# Patient Record
Sex: MALE | Race: WHITE
[De-identification: names, ages, dates, MRNs, and addresses within clinical notes are randomized per-mention and may not be internally consistent; named-entity substitution may affect disease eponyms.]

---

## 2020-12-23 ENCOUNTER — HOSPITAL ENCOUNTER (EMERGENCY)
Dept: HOSPITAL 97 - ER | Age: 70
Discharge: HOME | End: 2020-12-23
Payer: COMMERCIAL

## 2020-12-23 VITALS — SYSTOLIC BLOOD PRESSURE: 137 MMHG | TEMPERATURE: 98 F | OXYGEN SATURATION: 100 % | DIASTOLIC BLOOD PRESSURE: 89 MMHG

## 2020-12-23 DIAGNOSIS — R05: ICD-10-CM

## 2020-12-23 DIAGNOSIS — U07.1: Primary | ICD-10-CM

## 2020-12-23 LAB
BUN BLD-MCNC: 15 MG/DL (ref 7–18)
GLUCOSE SERPLBLD-MCNC: 107 MG/DL (ref 74–106)
HCT VFR BLD CALC: 35.2 % (ref 39.6–49)
LYMPHOCYTES # SPEC AUTO: 0.5 K/UL (ref 0.7–4.9)
PMV BLD: 7.8 FL (ref 7.6–11.3)
POTASSIUM SERPL-SCNC: 3.4 MMOL/L (ref 3.5–5.1)
RBC # BLD: 3.85 M/UL (ref 4.33–5.43)

## 2020-12-23 PROCEDURE — 71045 X-RAY EXAM CHEST 1 VIEW: CPT

## 2020-12-23 PROCEDURE — 96361 HYDRATE IV INFUSION ADD-ON: CPT

## 2020-12-23 PROCEDURE — 85025 COMPLETE CBC W/AUTO DIFF WBC: CPT

## 2020-12-23 PROCEDURE — 96374 THER/PROPH/DIAG INJ IV PUSH: CPT

## 2020-12-23 PROCEDURE — 99284 EMERGENCY DEPT VISIT MOD MDM: CPT

## 2020-12-23 PROCEDURE — 80048 BASIC METABOLIC PNL TOTAL CA: CPT

## 2020-12-23 PROCEDURE — 36415 COLL VENOUS BLD VENIPUNCTURE: CPT

## 2020-12-23 NOTE — ER
Nurse's Notes                                                                                     

 [facility]                                                                 

Name: Baljeet Baker                                                                             

Age: 70 yrs                                                                                       

Sex: Male                                                                                         

: 1950                                                                                   

MRN: [id number]                                                                                   

Arrival Date: 2020                                                                          

Time: 19:09                                                                                       

Account#: [id number]                                                                            

Bed 19                                                                                            

Private MD:                                                                                       

Diagnosis: SARS-associated coronavirus as the cause of diseases classified elsewhere;Fever,       

  unspecified                                                                                     

                                                                                                  

Presentation:                                                                                     

                                                                                             

20:03 Chief complaint: Patient states: Covid positive 12/15. Fever and SOB now. Had N/V/D,    ll1 

      but now has resolved. States he thought he was getting better, but fever spiked again       

      today.                                                                                      

20:05 Coronavirus screen: Client denies travel out of the [place] in the last 14 days. cough     ll1 

      unrelated to allergies, fatigue, fever, Client presents with at least one sign or           

      symptom that may indicate coronavirus-19. Standard/surgical mask placed on the client.      

      Client reports previous positive COVID test result. Ebola Screen: Patient denies travel     

      to an Ebola-affected area in the 21 days before illness onset. Initial Sepsis Screen:       

      Does the patient meet any 2 criteria? No. Patient's initial sepsis screen is negative.      

      Does the patient have a suspected source of infection? Yes: Productive cough/pneumonia.     

      Risk Assessment: Do you want to hurt yourself or someone else? Patient reports no           

      desire to harm self or others. Onset of symptoms was 2020.                     

20:05 Method Of Arrival: Ambulatory                                                           ll1 

20:05 Acuity: DENISE 3                                                                           ll1 

                                                                                                  

Historical:                                                                                       

- Allergies:                                                                                      

20:03 No Known Allergies;                                                                     ll1 

- PMHx:                                                                                           

20:03 Asthma;                                                                                 ll1 

- PSHx:                                                                                           

20:03 sinus surgeries x 4;                                                                    ll1 

                                                                                                  

- Immunization history:: Flu vaccine is up to date.                                               

- Social history:: Smoking status: Patient denies any tobacco usage or history of.                

                                                                                                  

                                                                                                  

Screenin:20 Abuse screen: Denies threats or abuse. Nutritional screening: No deficits noted.        ll2 

      Tuberculosis screening: No symptoms or risk factors identified. Fall Risk None              

      identified.                                                                                 

                                                                                                  

Assessment:                                                                                       

19:20 General: Appears in no apparent distress. Behavior is calm, cooperative, appropriate    ll2 

      for age. Pain: Denies pain. Neuro: Level of Consciousness is awake, alert, obeys            

      commands, Oriented to person, place, time, situation. Cardiovascular: Patient's skin is     

      warm and dry. Respiratory: Airway is patent Respiratory effort is even, unlabored,          

      Respiratory pattern is regular, symmetrical, Breath sounds are clear. GI: No signs          

      and/or symptoms were reported involving the gastrointestinal system. : No signs           

      and/or symptoms were reported regarding the genitourinary system. EENT: No signs and/or     

      symptoms were reported regarding the EENT system. Derm: Skin is intact, Skin is dry,        

      Skin is pink, warm \T\ dry. Musculoskeletal: Circulation, motion, and sensation intact.     

      Range of motion:.                                                                           

20:30 Reassessment: Patient and/or family updated on plan of care and expected duration. Pain ll2 

      level reassessed. Patient is alert, oriented x 3, equal unlabored respirations, skin        

      warm/dry/pink. son at bedside, pt denies pain at this time.                                 

21:30 Reassessment: Patient and/or family updated on plan of care and expected duration. Pain ll2 

      level reassessed. Patient is alert, oriented x 3, equal unlabored respirations, skin        

      warm/dry/pink. pt resting comfortably in bed.                                               

22:30 Reassessment: Patient and/or family updated on plan of care and expected duration. Pain ll2 

      level reassessed. Patient is alert, oriented x 3, equal unlabored respirations, skin        

      warm/dry/pink.                                                                              

                                                                                                  

Vital Signs:                                                                                      

19:22  / 79; Pulse 75; Resp 18; Temp 98; Pulse Ox 99% on R/A;                           ll2 

20:05  / 74; Pulse 60; Resp 18; Temp 98.0; Pulse Ox 100% ; Weight 79.38 kg; Height 6    ll1 

      ft. 0 in. (182.88 cm); Pain 0/10;                                                           

20:15  / 87; Pulse 75; Resp 16; Pulse Ox 99% on R/A;                                    ll2 

21:15  / 75; Pulse 55; Resp 18; Pulse Ox 99% on R/A;                                    ll2 

22:15  / 86; Pulse 50; Resp 17; Pulse Ox 99% on R/A;                                    ll2 

23:15  / 89; Pulse 50; Resp 18; Temp 98; Pulse Ox 100% on R/A;                          ll2 

20:05 Body Mass Index 23.73 (79.38 kg, 182.88 cm)                                             ll1 

                                                                                                  

ED Course:                                                                                        

19:09 Patient arrived in ED.                                                                  rg4 

19:20 Patient has correct armband on for positive identification. Placed in gown. Bed in low  ll2 

      position. Call light in reach. Side rails up X 1. Adult w/ patient. Pulse ox on. NIBP       

      on.                                                                                         

19:20 No provider procedures requiring assistance completed. Inserted saline lock: 22 gauge   ll2 

      in right antecubital area, using aseptic technique. Blood collected.                        

20:02 Arm band placed on Patient placed in an exam room, on a stretcher.                      ll1 

20:06 Triage completed.                                                                       ll1 

20:44 Sabine Cope FNP-C is PHCP.                                                          snw 

20:44 Nasir Llamas MD is Attending Physician.                                           snw 

20:53 Christine Kim, JURGEN is Primary Nurse.                                                  ll2 

21:50 Chest Single View XRAY In Process Unspecified.                                          EDMS

23:16 IV discontinued, intact, bleeding controlled, No redness/swelling at site. Pressure     ll2 

      dressing applied.                                                                           

                                                                                                  

Administered Medications:                                                                         

22:00 Drug: Aspirin Chewable Tablet 81 mg Route: PO;                                          ll2 

23:17 Follow up: Response: No adverse reaction                                                ll2 

22:00 Drug: PlaVIX 75 mg Route: PO;                                                           ll2 

23:17 Follow up: Response: No adverse reaction                                                ll2 

22:15 Drug: NS 0.9% 1000 ml Route: IV; Rate: 1 bolus; Site: right antecubital;                ll2 

23:17 Follow up: Response: No adverse reaction; IV Status: Completed infusion; IV Intake:     ll2 

      1000ml                                                                                      

22:20 Drug: fentaNYL (PF) 25 mcg Route: IVP; Site: right antecubital;                         ll2 

23:17 Follow up: Response: No adverse reaction                                                ll2 

                                                                                                  

                                                                                                  

Intake:                                                                                           

23:17 IV: 1000ml; Total: 1000ml.                                                              ll2 

                                                                                                  

Outcome:                                                                                          

22:48 Discharge ordered by MD.                                                                snw 

23:16 Discharged to home ambulatory.                                                          ll2 

23:16 Condition: stable                                                                           

23:16 Discharge instructions given to patient, Instructed on discharge instructions, follow       

      up and referral plans. Demonstrated understanding of instructions, follow-up care.          

23:17 Patient left the ED.                                                                    ll2 

                                                                                                  

Signatures:                                                                                       

Dispatcher [employer]                           EDMS                                                 

Sabine Cope FNP-C                   FNP-Csnw                                                  

Amalia Marquez                                 rg4                                                  

Christine Kim, RN                    RN   ll2                                                  

Laura Herron RN                       RN   ll1                                                  

                                                                                                  

**************************************************************************************************

## 2020-12-23 NOTE — XMS REPORT
Clinical Summary

                          Created on:2020



Patient:Baljeet Baker

Sex:Male

:1950

External Reference #:[id number]





Demographics







                          Address                   [address] 



                                                    [place] [address]

 

                          Home Phone                [phone]

 

                          Mobile Phone              [phone]

 

                          Email Address             [email or web address]

 

                          Preferred Language        English

 

                          Marital Status            Unknown

 

                          Yarsani Affiliation     Unknown

 

                          Race                      White

 

                          Ethnic Group              Not  or 









Author







                          Organization              [facility]

 

                          Address                   [address] [place] [address]









Support







                Name            Relationship    Address         Phone

 

                Kourtney Baker   Unavailable     Unavailable     [phone]









Care Team Providers







                    Name                Role                Phone

 

                    Chance Godfrey       Primary Care Provider [phone]









Allergies

No Known Allergies



Medications







          Medication Sig       Dispensed Refills   Start Date End Date  Status

 

          budesonide-formoterol Inhale 2 puffs by           0                   

          Active



          (SYMBICORT) 160-4.5 mouth via inhaler                                 

        



          mcg/actuation inhaler 2 (two) times                                   

      



                    daily.                                            

 

          OMEPRAZOLE ORAL Take by mouth.           0                            

 Active

 

          montelukast Take 10 mg by           0                             Acti

ve



          (SINGULAIR) 10 mg mouth nightly.                                      

   



          tablet                                                      







Active Problems

Not on file



Social History







             Tobacco Use  Types        Packs/Day    Years Used   Date

 

             Never Smoker                                        









                Smokeless Tobacco: Never Used                                 









                                        Tobacco Cessation: Counseling Given: No









                Alcohol Use     Drinks/Week     oz/Week         Comments

 

                No                                              









                          Sex Assigned at Birth     Date Recorded

 

                          Not on file               







Last Filed Vital Signs

Not on file



Plan of Treatment







                Health Maintenance Due Date        Last Done       Comments

 

                COLON CANCER SCREENING COLONOSCOPY 1950                   

   

 

                PNEUMOCOCCAL 65+ YRS (1 of 1 - [id number]) 10/26/2014 

                     

 

                INFLUENZA VACCINE (#1) 2020                      







Results

Not on fileafter 2019



Insurance







          Payer     Benefit Plan / Subscriber ID Effective Dates Phone     Addre

ss   Type



                    Group                                             

 

          [employer] PPO/POS [id number] 2018-Present                

     PPO



          [facility]ALTH[facility]ARE SYSTEM                                                   









           Guarantor Name Account Type Relation to Date of    Phone      Billing



                                 Patient    Birth                 Address

 

           Baljeet Baker Personal/Family Self       1950 [phone]

[address]                                      (Home)     [address]







                                                                  [place]



                                                                  [address]

## 2020-12-23 NOTE — EDPHYS
Physician Documentation                                                                           

 [facility]                                                                 

Name: Baljeet Baker                                                                             

Age: 70 yrs                                                                                       

Sex: Male                                                                                         

: 1950                                                                                   

MRN: [id number]                                                                                   

Arrival Date: 2020                                                                          

Time: 19:09                                                                                       

Account#: [id number]                                                                            

Bed 19                                                                                            

Private MD:                                                                                       

ED Physician Nasir Llamas                                                                    

HPI:                                                                                              

                                                                                             

21:32 This 70 yrs old  Male presents to ER via Ambulatory with complaints of Fever,  snw 

      COVID+.                                                                                     

21:32 The patient reports fever, that was measured at 102 degrees Fahrenheit. Onset: The      snw 

      symptoms/episode began/occurred recurred this am. Associated signs and symptoms:            

      Pertinent positives: cough, decreased appetite, fatigue, malaise, weight loss. Severity     

      of symptoms: At their worst the symptoms were moderate. The patient has experienced a       

      previous episode. The patient has been recently seen by a physician: with similar           

      presenting complaints, dx with CoVid on the , felt a little better and then today       

      awoke with fever to 102. SpO2 100% on Room air.                                             

                                                                                                  

Historical:                                                                                       

- Allergies:                                                                                      

20:03 No Known Allergies;                                                                     ll1 

- PMHx:                                                                                           

20:03 Asthma;                                                                                 ll1 

- PSHx:                                                                                           

20:03 sinus surgeries x 4;                                                                    ll1 

                                                                                                  

- Immunization history:: Flu vaccine is up to date.                                               

- Social history:: Smoking status: Patient denies any tobacco usage or history of.                

                                                                                                  

                                                                                                  

ROS:                                                                                              

21:31 Eyes: Negative for injury, pain, redness, and discharge, ENT: Negative for injury,      snw 

      pain, and discharge, Neck: Negative for injury, pain, and swelling, Cardiovascular:         

      Negative for chest pain, palpitations, and edema, Abdomen/GI: Negative for abdominal        

      pain, nausea, vomiting, diarrhea, and constipation, Back: Negative for injury and pain,     

      : Negative for injury, bleeding, discharge, and swelling, MS/Extremity: Negative for      

      injury and deformity, Skin: Negative for injury, rash, and discoloration, Neuro:            

      Negative for headache, weakness, numbness, tingling, and seizure.                           

21:31 Constitutional: Positive for body aches, fatigue, fever, malaise, poor PO intake,           

      weight loss.                                                                                

21:31 Respiratory: Positive for cough, shortness of breath, on exertion.                          

                                                                                                  

Exam:                                                                                             

21:30 Constitutional:  This is a well developed, well nourished patient who is awake, alert,  snw 

      and in no acute distress. listless Head/Face:  Normocephalic, atraumatic. Eyes:  Pupils     

      equal round and reactive to light, extra-ocular motions intact.  Lids and lashes            

      normal.  Conjunctiva and sclera are non-icteric and not injected.  Cornea within normal     

      limits.  Periorbital areas with no swelling, redness, or edema. ENT:  Nares patent. No      

      nasal discharge, no septal abnormalities noted.  Tympanic membranes are normal and          

      external auditory canals are clear.  Oropharynx with no redness, swelling, or masses,       

      exudates, or evidence of obstruction, uvula midline.  Mucous membranes moist. Neck:         

      Trachea midline, no thyromegaly or masses palpated, and no cervical lymphadenopathy.        

      Supple, full range of motion without nuchal rigidity, or vertebral point tenderness.        

      No Meningismus. Chest/axilla:  Normal chest wall appearance and motion.  Nontender with     

      no deformity.  No lesions are appreciated. Cardiovascular:  Regular rate and rhythm         

      with a normal S1 and S2.  No gallops, murmurs, or rubs.  Normal PMI, no JVD.  No pulse      

      deficits.                                                                                   

21:30 Abdomen/GI:  Soft, non-tender, with normal bowel sounds.  No distension or tympany.  No     

      guarding or rebound.  No evidence of tenderness throughout. Back:  No spinal                

      tenderness.  No costovertebral tenderness.  Full range of motion. Skin:  Warm, dry with     

      normal turgor.  Normal color with no rashes, no lesions, and no evidence of cellulitis.     

      MS/ Extremity:  Pulses equal, no cyanosis.  Neurovascular intact.  Full, normal range       

      of motion. Neuro:  Awake and alert, GCS 15, oriented to person, place, time, and            

      situation.  Cranial nerves II-XII grossly intact.  Motor strength 5/5 in all                

      extremities.  Sensory grossly intact.  Cerebellar exam normal.  Normal gait. Psych:         

      Awake, alert, with orientation to person, place and time.  Behavior, mood, and affect       

      are within normal limits.                                                                   

21:30 Respiratory: the patient does not display signs of respiratory distress,  Respirations:     

      shallow respirations, Breath sounds: are clear throughout.                                  

                                                                                                  

Vital Signs:                                                                                      

19:22  / 79; Pulse 75; Resp 18; Temp 98; Pulse Ox 99% on R/A;                           ll2 

20:05  / 74; Pulse 60; Resp 18; Temp 98.0; Pulse Ox 100% ; Weight 79.38 kg; Height 6    ll1 

      ft. 0 in. (182.88 cm); Pain 0/10;                                                           

20:15  / 87; Pulse 75; Resp 16; Pulse Ox 99% on R/A;                                    ll2 

21:15  / 75; Pulse 55; Resp 18; Pulse Ox 99% on R/A;                                    ll2 

22:15  / 86; Pulse 50; Resp 17; Pulse Ox 99% on R/A;                                    ll2 

23:15  / 89; Pulse 50; Resp 18; Temp 98; Pulse Ox 100% on R/A;                          ll2 

20:05 Body Mass Index 23.73 (79.38 kg, 182.88 cm)                                             ll1 

                                                                                                  

MDM:                                                                                              

20:46 Patient medically screened.                                                             snw 

22:51 Data reviewed: vital signs, nurses notes. Data interpreted: Pulse oximetry: on room air snw 

      is 100 %. Interpretation: normal. Counseling: I had a detailed discussion with the          

      patient and/or guardian regarding: the historical points, exam findings, and any            

      diagnostic results supporting the discharge/admit diagnosis, lab results, radiology         

      results, the need for outpatient follow up, to return to the emergency department if        

      symptoms worsen or persist or if there are any questions or concerns that arise at          

      home. Special discussion: Based on the history and exam findings, there is no               

      indication for further emergent testing or inpatient evaluation. I discussed with the       

      patient/guardian the need to see the primary care provider for further evaluation of        

      the symptoms.                                                                               

                                                                                                  

                                                                                             

21:28 Order name: CBC with Diff; Complete Time: 22:38                                         snw 

                                                                                             

21:28 Order name: Chem 7; Complete Time: 22:46                                                snw 

                                                                                             

21:28 Order name: Chest Single View XRAY                                                      snw 

                                                                                                  

Administered Medications:                                                                         

22:00 Drug: Aspirin Chewable Tablet 81 mg Route: PO;                                          ll2 

23:17 Follow up: Response: No adverse reaction                                                ll2 

22:00 Drug: PlaVIX 75 mg Route: PO;                                                           ll2 

23:17 Follow up: Response: No adverse reaction                                                ll2 

22:15 Drug: NS 0.9% 1000 ml Route: IV; Rate: 1 bolus; Site: right antecubital;                ll2 

23:17 Follow up: Response: No adverse reaction; IV Status: Completed infusion; IV Intake:     ll2 

      1000ml                                                                                      

22:20 Drug: fentaNYL (PF) 25 mcg Route: IVP; Site: right antecubital;                         ll2 

23:17 Follow up: Response: No adverse reaction                                                ll2 

                                                                                                  

                                                                                                  

Disposition:                                                                                      

23:29 Co-signature as Attending Physician, Nasir Llamas MD.                               ma2 

                                                                                                  

Disposition:                                                                                      

20 22:48 Discharged to Home. Impression: SARS-associated coronavirus as the cause of        

  diseases classified elsewhere, Fever, unspecified.                                              

- Condition is Stable.                                                                            

- Discharge Instructions: Fever, Adult, Viral Respiratory Infection, Aspirin and Your             

  Heart, Rehydration, Elderly.                                                                    

                                                                                                  

- Medication Reconciliation Form, Thank You Letter, Antibiotic Education, Prescription            

  Opioid Use form.                                                                                

- Follow up: Emergency Department; When: As needed; Reason: Worsening of condition.               

  Follow up: Private Physician; When: 2 - 3 days; Reason: Recheck today's complaints,             

  Continuance of care, Re-evaluation by your physician.                                           

                                                                                                  

                                                                                                  

                                                                                                  

Signatures:                                                                                       

Dispatcher [employer]                           EDMS                                                 

Sabine Cope, STEVEC                   GRETCHENP-Nasir Conde MD MD   ma2                                                  

Christine Kim RN                    RN   ll2                                                  

Laura Herron RN                       RN   ll1                                                  

                                                                                                  

Corrections: (The following items were deleted from the chart)                                    

23:17 22:48 2020 22:48 Discharged to Home. Impression: SARS-associated coronavirus as   ll2 

      the cause of diseases classified elsewhere; Fever, unspecified. Condition is Stable.        

      Forms are Medication Reconciliation Form, Thank You Letter, Antibiotic Education,           

      Prescription Opioid Use. Follow up: Emergency Department; When: As needed; Reason:          

      Worsening of condition. Follow up: Private Physician; When: 2 - 3 days; Reason: Recheck     

      today's complaints, Continuance of care, Re-evaluation by your physician. snw               

                                                                                                  

**************************************************************************************************

## 2020-12-23 NOTE — XMS REPORT
Continuity of Care Document

                          Created on:2020



Patient:ANDRE SOFIA

Sex:Male

:1950

External Reference #:[id number]





Demographics







                          Address                   [address] [place] [address] [address]



                                                    [place] [address]

 

                          Home Phone                [phone]

 

                          Work Phone                [phone]

 

                          Mobile Phone              [phone]

 

                          Email Address             [email or web address]

 

                          Preferred Language        English

 

                          Marital Status            Unknown

 

                          Buddhist Affiliation     Unknown

 

                          Race                      Unknown

 

                          Additional Race(s)        Unavailable



                                                    White

 

                          Ethnic Group              Unknown









Author







                          Organization              [facility]

t

 

                          Address                   [address]



                                                    [place] [address]

 

                          Phone                     [phone]









Support







                Name            Relationship    Address         Phone

 

                Samuel       Spouse          Unavailable     [phone]

 

                Samuel       Spouse          Unavailable     [phone]









Care Team Providers







                    Name                Role                Phone

 

                    Chance Godfrey MD    Primary Care Physician [phone]

 

                    LISA Varela MD        Attending Clinician [phone]









Payers







           Payer Name Policy Type Policy     Effective Date Expiration Date Sour

ce



                                 Number                           

 

           AETNAAETNA            [id number] 2019            [place]



           HMO,POS,EPO,                       00:00:00              [facility]



           CARLOS [id number]                                             



           2019-Present                                             



           HMO                                                    







Problems







       Condition Condition Condition Status Onset  Resolution Last   Treating Co

mments 

Source



       Name   Details Category        Date   Date   Treatment Clinician        



                                                 Date                 

 

       Postnasal Postnasal Problem Active                                    [facility]

[facility]



       drip   drip                                                    for ENT

 

       Diplopia Diplopia Problem Active                                    [facility]

[facility]



                                                                      for ENT

 

       Polyp of Polyp of Problem Active                                    [facility]

r



       nasal  nasal                                                   for ENT



       cavity cavity                                                  

 

       Otalgia, Otalgia, Problem Active                                    [facility]

r



       bilateral bilateral                                                  for 

ENT

 

       Sinusitis Sinusitis Problem Active                                    [facility]

ter



       - Chronic - Chronic                                                  for 

ENT

 

       Nasal  Nasal  Problem Active                                    Center



       airway airway                                                  for ENT



       obstructio obstructio                                                  



       n      n                                                       

 

       Unspecifie Unspecifie Problem Active                                    C

enter



       d nasal d nasal                                                  for ENT



       polyp  polyp                                                   

 

       Otitis Otitis Problem Active                                    Center



       externa - externa -                                                  for 

ENT



       Acute  Acute                                                   



       H60.339* H60.339*                                                  

 

       Eustachian Eustachian Problem Active                                    C

enter



       tube   tube                                                    for ENT



       dysfunctio dysfunctio                                                  



       n,     n,                                                      



       bilateral bilateral                                                  

 

       Allergic Allergic Problem Active                                    [facility]

r



       rhinitis, rhinitis,                                                  for 

ENT



       seasonal seasonal                                                  

 

       Headache Headache Problem Active                                    [facility]

r



                                                                      for ENT

 

       Chronic Chronic Problem Active                                    Center



       maxillary maxillary                                                  for 

ENT



       sinusitis sinusitis                                                  

 

       Epidermal Epidermal Problem Active                                    [facility]

ter



       cyst   cyst                                                    for ENT

 

       Cough  Cough  Problem Active                                    Center



                                                                      for ENT

 

       Otitis Otitis Problem Active                                    Center



       media - media -                                                  for ENT



       Chronic Chronic                                                  



       serous * serous *                                                  

 

       Eustachian Eustachian Problem Active                                    C

enter



       tube   tube                                                    for ENT



       dysfunctio dysfunctio                                                  



       n *    n *                                                     

 

       Obstructiv Obstructiv Problem Active                                    C

enter



       e sleep e sleep                                                  for ENT



       apnea  apnea                                                   

 

       Acute  Acute  Problem Active                                    Center



       pharyngiti pharyngiti                                                  fo

r ENT



       s      s                                                       

 

       Edema of Edema of Problem Active                                    [facility]

r



       larynx larynx                                                  for ENT

 

       GERD   GERD   Problem Active                                    Center



                                                                      for ENT

 

       Sinusitis Sinusitis Problem Active                                    [facility]

ter



       - Chronic - Chronic                                                  for 

ENT

 

       Chronic Chronic Problem Active                                    Center



       maxillary maxillary                                                  for 

ENT



       sinusitis sinusitis                                                  

 

       Allergic Allergic Problem Active                                    [facility]

r



       rhinitis rhinitis                                                  for EN

T

 

       Swelling, Swelling, Problem Active                                    [facility]

ter



       mass, lump mass, lump                                                  fo

r ENT



       in neck in neck                                                  

 

       Other  Other  Problem Active                                    Center



       specified specified                                                  for 

ENT



       disorders disorders                                                  



       of     of                                                      



       Eustachian Eustachian                                                  



       tube,  tube,                                                   



       unspecifie unspecifie                                                  



       d ear  d ear                                                   

 

       Chronic Chronic Problem Active                                    Center



       laryngitis laryngitis                                                  fo

r ENT



       (LPRD) (LPRD)                                                  

 

       Acute  Acute  Problem Active                                    Center



       serous serous                                                  for ENT



       otitis otitis                                                  



       media, media,                                                  



       left ear left ear                                                  

 

       Conductive Conductive Problem Active                                    C

enter



       hearing hearing                                                  for ENT



       loss,  loss,                                                   



       unilateral unilateral                                                  



       , left ear , left ear                                                  



       with   with                                                    



       restricted restricted                                                  



       hearing on hearing on                                                  



       the    the                                                     



       contralate contralate                                                  



       ral side ral side                                                  







Allergies, Adverse Reactions, Alerts

This patient has no known allergies or adverse reactions.



Family History







           Family Member Diagnosis  Comments   Start Date Stop Date  Source

 

           Natural father No Known Problems                                  [facility]

 

           Natural mother No Known Problems                                  Donna Flowers







Social History







           Social Habit Start Date Stop Date  Quantity   Comments   Source

 

           Sex Assigned At                                             [facility]

 

           Tobacco use and 2018-02-10 2018-02-10 Never used            [facility] -



           exposure   00:00:00   00:00:00                         [facility]

 

           Alcohol intake 2018-02-10 2018-02-10 Current               [facility]

es -



                      00:00:00   00:00:00   non-drinker of            Medical 

nter



                                            alcohol (finding)            









                Smoking Status  Start Date      Stop Date       Source

 

                Never smoker                                    [facility]







Medications







       Ordered Filled Start  Stop   Current Ordering Indication Dosage Frequency

 Signature

                    Comments            Components          Source



     Medication Medication Date Date Medication? Clinician                (SIG) 

          



     Name Name                                                   

 

     budesonide-      2020-0      Yes                      Take by           Donna

[facility]



     formoteroL      6-04                               mouth.           [facility]



     (Symbicort)      08:49:                                              st



     160-4.5      13                                                



     mcg/actuati                                                        



     on inhaler                                                        

 

     itraconazol      -0      Yes                      itraconazo           

[facility]



     e         6-04                               le 100 mg           [facility]



     (SPORANOX)      08:49:                               capsule           st



     100 mg      13                                                



     capsule                                                        

 

     montelukast      -0      Yes                      montelukas           

[facility]



     (SINGULAIR)      604                               t 10 mg           [facility]

di



     10 mg      08:49:                               tablet           st



     tablet      13                                                

 

     omeprazole      -0      Yes                      omeprazole           [facility]

[facility]



     (PriLOSEC)      04                               40 mg           [facility]



     40 MG      08:49:                               capsule,de           st



     capsule      13                                 layed           



                                                  release           

 

     atorvastati      -0      Yes            20mg QD   Take 20 mg           

Vin



     n (LIPITOR)      -04                               by mouth           Meth

aundrea



     20 mg      08:49:                               daily.           st



     tablet      13                                 Default OP           



                                                  ins            

 

     clopidogreL      -0      Yes            75mg QD   Take 75 mg           

[facility]



     (PLAVIX) 75      -04                               by mouth           Meth

aundrea



     mg tablet      08:49:                               daily.           st



               13                                                

 

     Omeprazole Omeprazole -0      Yes  Jay Joe                1 capsule  

         Center



               3-08                                              for ENT



               00:00:                                              



               00                                                

 

     budesonide-      2018-0      Yes            2{puff} Q.5D Inhale 2          

 [facility]



     formoterol      2-10                               puffs by           [facility]

 -



     (SYMBICORT)      15:00:                               mouth via           M

edical



     160-4.5      05                                 inhaler 2           Center



     mcg/actuati                                         (two)           



     on inhaler                                         times           



                                                  daily.           

 

     OMEPRAZOLE      -0      Yes                      Take by           [facility] 

St



     ORAL      2-10                               mouth.           [facility] -



               15:00:                                              Medical



               05                                                Center

 

     montelukast      -0      Yes            10mg QD   Take 10 mg           

[facility]



     (SINGULAIR)      2-10                               by mouth           [facility]

s -



     10 mg      15:00:                               nightly.           Medical



     tablet      05                                                Center







Vital Signs







             Vital Name   Observation Time Observation Value Comments     Source

 

             Body height  2020 08:39:00 182.9 cm                  [facility]

 

             Body weight  2020 08:39:00 79.379 kg                 [facility]

 

             BMI          2020 08:39:00 23.73 kg/m2               [facility]







Procedures







                Procedure       Date / Time     Performing Clinician Source



                                Performed                       

 

                SD ARTHROCENTESIS 2020 09:45:00 Jose Varela

ethodist



                ASPIR&/INJ MAJOR JT/BURSA                                 



                W/O US                                          

 

                SD ARTHROCENTESIS 2020 09:45:00 Jose Varela

ethodist



                ASPIR&/INJ MAJOR JT/BURSA                                 



                W/O US                                          

 

                XR SHOULDER 2+ VW RIGHT 2020 08:50:25 Jose Varela [facility]







Plan of Care







             Planned Activity Planned Date Details      Comments     Source

 

             Future Scheduled 2020   INFLUENZA VACCINE (#1)              C

[facility]



             Test         00:00:00     [code = INFLUENZA              Medical Ce

nter



                                       VACCINE (#1)]              

 

             Future Scheduled 2020   INFLUENZA VACCINE              [place]

n [facility]



             Test         00:00:00     [code = INFLUENZA              



                                       VACCINE]                  

 

             Future Scheduled 2014-10-26   65+ PNEUMOCOCCAL              [place]

 [facility]



             Test         00:00:00     VACCINE (1 of 1 -              



                                       PPSV23) [code = 65+              



                                       PNEUMOCOCCAL VACCINE              



                                       (1 of 1 - PPSV23)]              

 

             Future Scheduled 2014-10-26   PNEUMOCOCCAL 65+ YRS              [facility]



             Test         00:00:00     (1 of 1 -                 Medical Center



                                       [id number] PCV13)              



                                       [code = PNEUMOCOCCAL              



                                       65+ YRS (1 of 1 -              



                                       [id number] PCV13)]              

 

             Future Scheduled 1999-10-26   COLONOSCOPY SCREENING              [facility]

[place] [facility]



             Test         00:00:00     [code = COLONOSCOPY              



                                       SCREENING]                

 

             Future Scheduled 1999-10-26   SHINGLES VACCINES (#1)              [facility]

[place] [facility]



             Test         00:00:00     [code = SHINGLES              



                                       VACCINES (#1)]              

 

             Future Scheduled 1965-10-26   COVID-19 VACCINE (#1)              [facility]

[place] [facility]



             Test         00:00:00     [code = COVID-19              



                                       VACCINE (#1)]              

 

             Future Scheduled 1950   Screening for              [facility]

es -



             Test         00:00:00     malignant neoplasm of              Medica

l Center



                                       colon (procedure)              



                                       [code = 542395684]              







Encounters







        Start   End     Encounter Admission Attending Care    Care    Encounter 

Source



        Date/Time Date/Time Type    Type    Clinicians Facility Department ID   

   

 

        2020 Outpatient         [facility]     [id number]

[address] [place]



        00:00:00 00:00:00                 JOSE                 [id number]     Method

i



                                                                        st

 

        2020 Outpatient         [facility]     [id number]

[address] [place]



        00:00:00 00:00:00                 JOSE                 384     Method

i



                                                                        

 

        2020 Outpatient                 [facility]     [id number]

[address] [place]



        00:00:00 00:00:00                                         042     Method

i



                                                                        

 

        2020 Outpatient         ALEJANDRINA   [facility]     [id number]

[address] [place]



        00:00:00 00:00:00                 JOSE                 745     Method

i



                                                                        

 

        2019 Outpatient                 The     [id number]

03  Center



        10:59:00 10:59:00                         [facility]

[facility]



                                                [facility]             



                                                [facility]                     

 

        2018 Outpatient                 The     [facility] [id number]

91  Center



        14:00:00 14:00:00                         [facility]         for [facility]

T



                                                [facility]             



                                                [facility]                     

 

        2018-10-30 2018-10-30 Outpatient                 The     [facility] 6037

44  Center



        10:30:00 10:30:00                         [facility]         for [facility]

T



                                                [facility]             



                                                [facility]                     

 

        2018-10-26 2018-10-26 Outpatient                 The     [facility] [id number]

43  Center



        13:05:00 13:05:00                         [facility]         for [facility]

T



                                                [facility]             



                                                [facility]                     

 

        2018 Outpatient                 The     [facility] [id number]

31  Center



        09:26:00 09:26:00                         [facility]         for [facility]

T



                                                [facility]             



                                                [facility]P                     

 

        2018 Outpatient                 The     [facility] [id number]

53  Center



        13:37:00 13:37:00                         [facility]         for [facility]

T



                                                [facility]             



                                                [facility]P                     







Results







           Test Description Test Time  Test Comments Results    Result     Sour

e



                                                       Comments   

 

           Large Joint 2020             [facility]



           Arthrocentesis: 8                        2020  2:03            M

ethodist



           shoulder, R 09:45:00              PMLarge Joint            



           subacromial                       Arthrocentesis:            



           bursa                            shoulder, R            



                                            subacromial            



                                            bursaConsent given            



                                            by: patientSupporting            



                                            DocumentationIndicati            



                                            ons: pain Procedure            



                                            DetailsUltrasound            



                                            guided: noPlatelet            



                                            Rich Plasma Used: no            



                                            PRP UsedLocation:            



                                            shoulder - R            



                                            subacromial bursa            



                                            Right side:Needle            



                                            size: 25 GApproach:            



                                            anteriorRight            



                                            shoulder medications            



                                            administered: 1 mL            



                                            lidocaine 10 mg/mL (1            



                                            %); 6 mg              



                                            betamethasone acetate            



                                            & sodium phosphate 6            



                                            mg/mLPatient            



                                            tolerance: patient            



                                            tolerated the            



                                            procedure well with            



                                            no immediate            



                                            complications            

 

           Large Joint              Jose Varela MD            Donna

stomarcello



           Arthrocentesis: 4                        2020  1:52            Me

thodist



           shoulder, R 09:45:00              PMLarge Joint            



           subacromial                       Arthrocentesis:            



           bursa                            shoulder, R            



                                            subacromial            



                                            bursaConsent given            



                                            by: patientSupporting            



                                            DocumentationIndicati            



                                            ons: pain Procedure            



                                            DetailsUltrasound            



                                            guided: noPlatelet            



                                            Rich Plasma Used: no            



                                            PRP UsedLocation:            



                                            shoulder - R            



                                            subacromial bursa            



                                            Right side:Needle            



                                            size: 25 GApproach:            



                                            anteriorRight            



                                            shoulder medications            



                                            administered: 1 mL            



                                            lidocaine 10 mg/mL (1            



                                            %); 6 mg              



                                            betamethasone acetate            



                                            & sodium phosphate 6            



                                            mg/mLPatient            



                                            tolerance: patient            



                                            tolerated the            



                                            procedure well with            



                                            no immediate            



                                            complications            

 

           FL, ESOPHAGUS 2018-10-3  Reason for FINAL REPORT PATIENT            



                      1          Exam:->chronic ID:   [id number] EXAM:            



                      14:36:00   laryngitis Esophagram was            



                                            performed with sodium            



                                            carbonate and barium.            



                                            INDICATION:            



                                            68-year-old man with            



                                            chronic laryngitis.            



                                            COMPARISON: None.            



                                            FINDINGS:Swallow: The            



                                            swallowing mechanism            



                                            was grossly normal.            



                                            The esophagus was            



                                            normally distensible            



                                            and the mucosa was            



                                            normal in appearance.            



                                            Esophageal motility            



                                            was within normal            



                                            limits.               



                                            Gastroesophageal            



                                            junction: No evidence            



                                            of hiatal hernia.            



                                            Reflux: No evidence            



                                            of reflux. The            



                                            visualized portions            



                                            of the stomach and            



                                            proximal small bowel            



                                            are unremarkable.            



                                            Fluoroscopy time: 61            



                                            secondsNumber of            



                                            images: 18            



                                            IMPRESSION:Normal            



                                            esophagram. Signed:            



                                            Rylie Burns            



                                            MDReport Verified            



                                            Date/Time:            



                                            10/31/2018 14:36:23            



                                            Reading Location:            



                                            [address]            



                                            Radiology Reading            



                                            Room                  



                                            Electronically signed            



                                            by: RYLIE BURNS MD on            



                                            10/31/2018 02:36 PM            

 

           RAD, CHEST, 2 2018  Reason for FINAL REPORT PATIENT            



           VIEWS      0          exam:->COUGHSh ID:   [id number]            



                      15:51:00   ould this be TECHNIQUE: 2 views of            



                                 performed at the chest.            



                                 the        COMPARISON: 3/31/2012            



                                 bedside?->No FINDINGS: The cardiac            



                                            silhouette is within            



                                            normal limits.            



                                            Mediastinum is            



                                            unremarkable. Lungs            



                                            appear hyperexpanded,            



                                            otherwise clear.            



                                            Osseous structures            



                                            appear unremarkable.            



                                            Soft tissues appear            



                                            unremarkable.            



                                            IMPRESSION: No acute            



                                            cardiopulmonary            



                                            disease. Signed:            



                                            Gerry Larsenort            



                                            Verified Date/Time:            



                                            02/10/2018 15:51:04            



                                            Reading Location: [facility]            



                                            B1 C013T Transitional            



                                            Reading Room            



                                            Electronically signed            



                                            by: GERRY LARSEN M.D. on 02/10/2018            



                                            03:51 PM

## 2020-12-23 NOTE — XMS REPORT
Clinical Summary

                          Created on:2020



Patient:Baljeet Baker

Sex:Male

:1950

External Reference #:[id number]





Demographics







                          Address                   [address]



                                                    [place] [address]

 

                          Home Phone                [phone]

 

                          Mobile Phone              [phone]

 

                          Email Address             [email or web address]

 

                          Preferred Language        English

 

                          Marital Status            

 

                          Denominational Affiliation     Unknown

 

                          Race                      White

 

                          Ethnic Group              Not  or 









Author







                          Organization              [employer]

 

                          Address                   [address] [place] [address]









Support







                Name            Relationship    Address         Phone

 

                Devorah Baker Spouse          Unavailable     [phone]

[phone]









Care Team Providers







                    Name                Role                Phone

 

                    Rohit Godfrey MD Primary Care Provider [phone]









Allergies

No Known Active Allergies



Medications







          Medication Sig       Dispensed Refills   Start Date End Date  Status

 

          budesonide-formotero Take by mouth.           0                       

      Active



          L (Symbicort)                                                   



          160-4.5                                                     



          mcg/actuation                                                   



          inhaler                                                     

 

          itraconazole itraconazole 100 mg           0                          

   Active



          (SPORANOX) 100 mg capsule                                           



          capsule                                                     

 

          montelukast montelukast 10 mg           0                             

Active



          (SINGULAIR) 10 mg tablet                                            



          tablet                                                      

 

          omeprazole omeprazole 40 mg           0                             Ac

tive



          (PriLOSEC) 40 MG capsule,delayed                                      

   



          capsule   release                                           

 

          atorvastatin Take 20 mg by mouth           0                          

   Active



          (LIPITOR) 20 mg daily. Default OP                                     

    



          tablet    ins                                               

 

          clopidogreL (PLAVIX) Take 75 mg by mouth           0                  

           Active



          75 mg tablet daily.                                            







Active Problems

No known active problems



Encounters







             Date         Type         Specialty    Care Team    Description

 

             2020   Office Visit Orthopedic Surgery Mau Varela Rota

tor cuff 

tendinitis, right (Primary Dx);



                                                    MD           Acute pain of r

ight shoulder

 

             2020   Travel                                 

 

             2020   Office Visit Orthopedic Surgery Mau Varela Acut

e pain of right

shoulder (Primary Dx);



                                                    MD           Rotator cuff michaelle roweinikendell, right

 

             2020   Travel                                 

 

             2020   Office Visit Orthopedic Surgery Mau Varela Acut e pain of right

shoulder (Primary Dx);



                                                    MD           Rotator cuff te

ndinitis, right

 

             2020   Travel                                 

 

             2020   Travel                                 



after 2019



Surgical History







                Surgery         Date            Site/Laterality Comments

 

                SINUS SURGERY                                   

 

                HERNIA REPAIR                                   







Medical History







                    Medical History     Date                Comments

 

                    COPD (chronic obstructive pulmonary disease) (HCC)          

           

 

                    Hyperlipidemia                          

 

                    GERD (gastroesophageal reflux disease)                     







Family History







                Medical History Relation        Name            Comments

 

                No Known Problems Father                          

 

                No Known Problems Mother                          









                Relation        Name            Status          Comments

 

                Father                                          

 

                Mother                                          







Social History







             Tobacco Use  Types        Packs/Day    Years Used   Date

 

             Never Smoker                                        









                          Sex Assigned at Birth     Date Recorded

 

                          Not on file               







Last Filed Vital Signs







                Vital Sign      Reading         Time Taken      Comments

 

                Blood Pressure  -               -               

 

                Pulse           -               -               

 

                Temperature     -               -               

 

                Respiratory Rate -               -               

 

                Oxygen Saturation -               -               

 

                Inhaled Oxygen Concentration -               -               

 

                Weight          79.4 kg (175 lb) 2020  8:39 AM CDT 

 

                Height          182.9 cm (6')   2020  8:39 AM CDT 

 

                Body Mass Index 23.73           2020  8:39 AM CDT 







Plan of Treatment







                Health Maintenance Due Date        Last Done       Comments

 

                COVID-19 VACCINE (#1) 10/26/1965                      

 

                COLONOSCOPY SCREENING 10/26/1999                      

 

                SHINGLES VACCINES (#1) 10/26/1999                      

 

                65+ PNEUMOCOCCAL VACCINE (1 of 1 - PPSV23) 10/26/2014           

           

 

                INFLUENZA VACCINE 2020                      







Procedures







             Procedure Name Priority     Date/Time    Associated   Comments



                                                    Diagnosis    

 

             VA ARTHROCENTESIS Routine      2020  9:45 Acute pain of Resul

ts for this



                ASPIR&/INJ MAJOR                 AM CDT          right shoulder



                                        procedure are in



             JT/BURSA W/O US                           Rotator cuff the results



                                                    tendinitis, right section.

 

             VA ARTHROCENTESIS Routine      2020  9:45 Acute pain of Resul

ts for this



                ASPIR&/INJ MAJOR                 AM CDT          right shoulder



                                        procedure are in



             JT/BURSA W/O US                           Rotator cuff the results



                                                    tendinitis, right section.

 

             XR SHOULDER 2+ VW RIGHT Routine      2020  8:50 Acute pain of

 Results for this



                                       AM CDT       right shoulder procedure are

 in



                                                                 the results



                                                                 section.



after 2019



Results

Large Joint Arthrocentesis: shoulder, R subacromial bursa (2020  9:45 AM 
CDT)





                          Narrative                 Performed At

 

                                        Mau Varela MD   2020 2

:03 PM



                                        



                                        Large Joint Arthrocentesis: shoulder, R 

subacromial bursa



                                        



                                        Consent given by: patient



                                        



                                        Supporting Documentation



                                        



                                        Indications: pain 



                                        



                                        Procedure Details



                                        



                                        Ultrasound guided: no



                                        



                                        Platelet Rich Plasma Used: no PRP Used



                                        



                                        Location: shoulder - R subacromial bursa



                                        



                                         



                                        



                                        Right side:



                                        



                                        Needle size: 25 G



                                        



                                        Approach: anterior



                                        



                          Right shoulder medications administered: 1 mL lidocain

e 10 mg/mL (1 %); 



                                        6 



                                        



                                        mg betamethasone acetate & sodium phosph

ate 6 mg/mL



                                        



                          Patient tolerance: patient tolerated the procedure wel

l with no 



                                        immediate 



                                        



                                        complications



                                        



                                         



                                        



                                         



                                        



                                                    



Large Joint Arthrocentesis: shoulder, R subacromial bursa (2020  9:45 AM 
CDT)





                          Narrative                 Performed At

 

                                        Mau Varela MD   2020 1:

52 PM



                                        



                                        Large Joint Arthrocentesis: shoulder, R 

subacromial bursa



                                        



                                        Consent given by: patient



                                        



                                        Supporting Documentation



                                        



                                        Indications: pain 



                                        



                                        Procedure Details



                                        



                                        Ultrasound guided: no



                                        



                                        Platelet Rich Plasma Used: no PRP Used



                                        



                                        Location: shoulder - R subacromial bursa



                                        



                                         



                                        



                                        Right side:



                                        



                                        Needle size: 25 G



                                        



                                        Approach: anterior



                                        



                          Right shoulder medications administered: 1 mL lidocain

e 10 mg/mL (1 %); 



                                        6 



                                        



                                        mg betamethasone acetate & sodium phosph

ate 6 mg/mL



                                        



                          Patient tolerance: patient tolerated the procedure wel

l with no 



                                        immediate 



                                        



                                        complications



                                        



                                         



                                        



                                         



                                        



                                                    



XR Shoulder 2+ Vw Right (2020  8:50 AM CDT)





                                        Specimen

 

                                        









                          Narrative                 Performed At

 

                                        This result has an attachment that is no

t available.



Internal and external rotation AP and outlet view x-rays of the right [facility]



                          shoulder are normal. There is no joint line narrowin

g, no spurring and no 



                          soft tissue calcification. 









                Performing Organization Address         City/State/ZIP Code Phon

e Number

 

                [facility]      [address] [place] [address] 



after 2019



Insurance







          Payer     Benefit Plan / Group Subscriber ID Effective Dates Phone    

 Address   Type

 

          [employer]     [employer] HMO,POS,EPO, [employer] [id number] 2019-Present       

              HMO









           Guarantor Name Account Type Relation to Date of    Phone      Billing



                                 Patient    Birth                 Address

 

           Baljeet Baker Personal/Family Self       1950 [phone] [address]







                                                       (Home)     [place], [place]



                                                                  [address]







Advance Directives

For more information, please contact: [phone]





                Type            Date Recorded   Patient Representative Explanati

on

 

                Advance Directives, Living Will and                             

    



                Medical Power of

## 2020-12-24 NOTE — RAD REPORT
EXAM DESCRIPTION:  Johana Single View12/23/2020 9:50 pm

 

CLINICAL HISTORY:  Cough

 

COMPARISON:  2016

 

FINDINGS:  The lungs are mildly hazy.

 

Heart is normal size. Lungs are hyperaerated

 

IMPRESSION:  The lungs are mildly hazy. This probably is secondary to overlying soft tissue. Mild inf
iltrates can also have this appearance. If clinically indicated further evaluation with PA and latera
l chest series could be obtained

## 2022-05-20 LAB
BUN BLD-MCNC: 18 MG/DL (ref 7–18)
GLUCOSE SERPLBLD-MCNC: 116 MG/DL (ref 74–106)
HCT VFR BLD CALC: 36 % (ref 39.6–49)
INR BLD: 1.08
LYMPHOCYTES # SPEC AUTO: 0.8 K/UL (ref 0.7–4.9)
PMV BLD: 7.9 FL (ref 7.6–11.3)
POTASSIUM SERPL-SCNC: 4.4 MMOL/L (ref 3.5–5.1)
RBC # BLD: 3.86 M/UL (ref 4.33–5.43)

## 2022-05-20 NOTE — RAD REPORT
EXAM DESCRIPTION:  RAD - Chest Pa And Lat (2 Views) - 5/20/2022 3:20 pm

 

CLINICAL HISTORY:  Pre op pending urolift

 

COMPARISON:  Portable chest 12/23/2020, two view chest 11/28/2016

 

TECHNIQUE:  Frontal and lateral views of the chest were obtained.

 

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 The fibr
otic lung pattern is not clearly different from comparison. No superimposed failure, infiltrate or ma
ss lesions seen.

 

  Trachea is midline. Heart size is normal and central vasculature is within normal limits.  No pleur
al effusion or pneumothorax seen.  No acute bony finding noted.  No aortic abnormality.

 

IMPRESSION:  COPD pattern not substantially different from comparison imaging.

 

No acute cardiopulmonary finding.

## 2022-05-23 NOTE — EKG
Test Date:    2022-05-20               Test Time:    15:00:43

Technician:   FRANDY                                     

                                                     

MEASUREMENT RESULTS:                                       

Intervals:                                           

Rate:         64                                     

MI:           142                                    

QRSD:         88                                     

QT:           408                                    

QTc:          420                                    

Axis:                                                

P:            48                                     

MI:           142                                    

QRS:          56                                     

T:            54                                     

                                                     

INTERPRETIVE STATEMENTS:                                       

                                                     

Normal sinus rhythm

Normal ECG

Compared to ECG 04/08/2014 10:07:27

Sinus bradycardia no longer present



Electronically Signed On 05-23-22 11:24:05 CDT by Abrahan Kaufman

## 2022-05-31 ENCOUNTER — HOSPITAL ENCOUNTER (OUTPATIENT)
Dept: HOSPITAL 97 - OR | Age: 72
Discharge: HOME | End: 2022-05-31
Attending: UROLOGY
Payer: COMMERCIAL

## 2022-05-31 VITALS — DIASTOLIC BLOOD PRESSURE: 76 MMHG | OXYGEN SATURATION: 100 % | TEMPERATURE: 97.8 F | SYSTOLIC BLOOD PRESSURE: 124 MMHG

## 2022-05-31 DIAGNOSIS — J45.909: ICD-10-CM

## 2022-05-31 DIAGNOSIS — Z20.822: ICD-10-CM

## 2022-05-31 DIAGNOSIS — K21.9: ICD-10-CM

## 2022-05-31 DIAGNOSIS — N40.1: Primary | ICD-10-CM

## 2022-05-31 PROCEDURE — 80048 BASIC METABOLIC PNL TOTAL CA: CPT

## 2022-05-31 PROCEDURE — 0T7D8DZ DILATION OF URETHRA WITH INTRALUMINAL DEVICE, VIA NATURAL OR ARTIFICIAL OPENING ENDOSCOPIC: ICD-10-PCS

## 2022-05-31 PROCEDURE — 87086 URINE CULTURE/COLONY COUNT: CPT

## 2022-05-31 PROCEDURE — 36415 COLL VENOUS BLD VENIPUNCTURE: CPT

## 2022-05-31 PROCEDURE — 71046 X-RAY EXAM CHEST 2 VIEWS: CPT

## 2022-05-31 PROCEDURE — 87088 URINE BACTERIA CULTURE: CPT

## 2022-05-31 PROCEDURE — 85610 PROTHROMBIN TIME: CPT

## 2022-05-31 PROCEDURE — 93005 ELECTROCARDIOGRAM TRACING: CPT

## 2022-05-31 PROCEDURE — 85025 COMPLETE CBC W/AUTO DIFF WBC: CPT

## 2022-05-31 NOTE — OP
Surgeon:  ANDREA BECKFORD



Preoperative Diagnosis:  Benign prostatic hypertrophy with lower urinary tract obstruction and sympto
matology.



Postoperative Diagnoses:  

1.Benign prostatic hypertrophy with lower urinary tract obstruction and symptomatology.

2.Urethral stricture in the bulb.



Principal Procedures:  

1.Cystoscopy and urethral dilation over a wire.

2.UroLift with 6 implants placed, 4 on the left and 2 on the right.



Indication For Procedure:  Mr. Baker is a 71-year-old gentleman who presented to [facility] w
ith bothersome lower urinary symptoms due to BPH.  He was intolerant to alpha-blocker therapy and ref
ractory symptoms despite attempts at alpha-blocker therapy.  As a result, he underwent cystoscopic ev
aluation, which revealed the presence of lateral lobar hypertrophy with a slight elevation of the med
isaias bar with no intravesical projection, and he was discussed to be a candidate for the UroLift and e
lected that approach.



Procedure In Detail:  The patient was consented in the preoperative holding area before being transfe
rred to the operative suite where general anesthesia was induced.  He was given Ancef 2 g IV antimicr
obial prophylaxis and Pneumoboots were provided for DVT prophylaxis.  He was placed in the lithotomy 
position, padded and secured to the table appropriately.  His genitalia were prepped using Hibiclens 
and he was draped in standard fashion.  The case was then begun using the UroLift cystoscopy bridge t
hat was 20-Burkinan inserted via his urethra until within the bowl, just distal to the striated sphinct
er, an area of annular stricture was noted posteriorly.  The stricture did prohibit passage of the 20
-Burkinan cystoscope, so I removed the UroLift bridge and instead switched it for a standard 22-Burkinan 
rigid cystoscope.  I then passed the cystoscope to the point of obstruction in the urethra and passed
 a Bentson guidewire via the urethra beyond the obstruction into his bladder.  Over the wire, I then 
removed the cystoscope leaving the wire in place, and then dilated the strictured area to 24-Burkinan s
equentially from 20-Burkinan.  I then removed the 24-Burkinan dilator and reinserted the 22-Burkinan cystos
cope over the wire and was able to navigate via the prostatic urethra this time into his bladder.  I 
then decompressed the bladder of fluid and urine, and then removed the cystoscope. 



I then replaced the 20-Burkinan UroLift bridge via the urethra and into the bladder.  I surveyed the pr
ostatic urethra again identifying the lateral lobar hypertrophy as well as a slight elevation of a me
george bar with no intravesical projection.  The bridge was then replaced with UroLift delivery device,
 and the first treatment site was at the bladder neck approximately 2 cm distal to the bladder neck. 
 The distal tip of the delivery device was angled laterally approximately 20 to 30 degrees to ganga
s the lateral lobe anteriorly.  The trigger was pulled, thereby deploying a needle containing the imp
lant through the prostate.  The needle was then retracted allowing one end of the implant to be deliv
ered to the capsular surface of the prostate.  The implant was then tensioned to assure capsular seat
ing and removal of slack monofilament.  The device was then angled back towards the midline and slowl
y advanced properly, typically 3 to 4 mm, until cystoscopic verification of the monofilament being ce
ntered in the delivery was noted.  The urethral end piece was then affixed to the monofilament, there
by tailoring the size of the implant.  Excess filament was then severed.  The device was then readvan
beryl into the bladder.  The delivery device was then replaced with another delivery device, and a mateusz
lar approach was taken in the right lateral lobe approximately 2 cm distal to the bladder neck.  I th
en replaced the delivery device with the cystoscope bridge and surveyed the anatomy created.  With 2 
implants placed in the bladder neck and already evident opening at the bladder neck, we then visualiz
ed the area around the verumontanum.  The lateral lobar hypertrophy there needed to be treated, so I 
replaced the cystoscope bridge with another UroLift delivery device and applied an implant at the lef
t apical lateral tissue at the level of the verumontanum.  This device was utilized as had been previ
ously described, and once deployed, the device was readvanced into the bladder before being exchanged
 for another UroLift delivery device.  A similar approach was taken in the right lateral lobe at the 
verumontanum.  After 4 implants were placed, I then switched the delivery device for the cystoscope b
ridge again and resurveyed the prostatic urethra.  There was residual anterolateral overhang from the
 left lateral zone of the prostate.  As a result, an additional UroLift delivery device was employed 
between the bladder neck and the verumontanum staple zone to elevate that tissue.  Once this was done
, I then switched back to the obturator and the bridge to resurvey the area.  Since there was some re
sidual anterolateral overhang right at the level of the bladder neck anterior to the staple placed in
 that location, an additional delivery device was utilized to place one last implant slightly anterio
r to the tissue and the staple placed approximately 2 cm to the bladder neck location.  Once this was
 placed, the bridge was then reinserted, and the channel was surveyed and noted to be patent from the
 verumontanum through to the bladder neck.  As a result, no further implants were deemed appropriate 
or necessary, and I left his bladder full before placing a 16-Burkinan silicone Valero catheter into his
 bladder with ease.  Because he had a latex allergy, we were unable to find a non-latex catheter larg
er than the 16-Burkinan.  Approximately 10 cc of sterile water was placed in the balloon, and the bladd
er was decompressed with some light pink urine.  The catheter was connected to a leg bag, and the pat
ient was taken out of the lithotomy position.  He was then awakened from general anesthesia, transfer
red to a stretcher, and then transferred to the recovery room in good condition.



Complications:  None.



Discharge Disposition:  He will remove the catheter himself tomorrow morning at 7 a.m. and will be di
scharged with a prescription for Bactrim Double Strength tablets twice daily for the next 5 days.  Norman
bsequent followup may be established in approximately 1 month to review the interval assessment of hi
s symptoms.





FREDDY/MODL

DD:  05/31/2022 09:58:33Voice ID:  [id number]

DT:  05/31/2022 20:13:09Report ID:  [id number]